# Patient Record
Sex: FEMALE | Race: WHITE | NOT HISPANIC OR LATINO
[De-identification: names, ages, dates, MRNs, and addresses within clinical notes are randomized per-mention and may not be internally consistent; named-entity substitution may affect disease eponyms.]

---

## 2018-10-01 ENCOUNTER — APPOINTMENT (OUTPATIENT)
Dept: OBGYN | Facility: CLINIC | Age: 68
End: 2018-10-01
Payer: MEDICARE

## 2018-10-01 VITALS
BODY MASS INDEX: 23.92 KG/M2 | WEIGHT: 135 LBS | HEIGHT: 63 IN | DIASTOLIC BLOOD PRESSURE: 88 MMHG | SYSTOLIC BLOOD PRESSURE: 142 MMHG

## 2018-10-01 PROCEDURE — G0101: CPT

## 2018-10-01 PROCEDURE — 82270 OCCULT BLOOD FECES: CPT

## 2020-11-11 ENCOUNTER — NON-APPOINTMENT (OUTPATIENT)
Age: 70
End: 2020-11-11

## 2020-12-02 ENCOUNTER — APPOINTMENT (OUTPATIENT)
Dept: OBGYN | Facility: CLINIC | Age: 70
End: 2020-12-02
Payer: MEDICARE

## 2020-12-02 VITALS
DIASTOLIC BLOOD PRESSURE: 74 MMHG | WEIGHT: 126 LBS | SYSTOLIC BLOOD PRESSURE: 113 MMHG | BODY MASS INDEX: 22.32 KG/M2 | HEIGHT: 63 IN

## 2020-12-02 PROCEDURE — 82270 OCCULT BLOOD FECES: CPT

## 2020-12-02 PROCEDURE — G0101: CPT

## 2020-12-02 NOTE — HISTORY OF PRESENT ILLNESS
[FreeTextEntry1] : 69yo,  with gyn history significant for:\par 1.Vulvovaginal atrophy\par 2.Hot flashes\par LMP 1999

## 2020-12-02 NOTE — DISCUSSION/SUMMARY
[FreeTextEntry1] : 69yo without gyn complaint.\par Flashes perisit despite Effexor(per IM) \par Plan:\par 1.Mammo\par 2.BMD\par 3.Colonosocpy per routine

## 2021-01-11 ENCOUNTER — NON-APPOINTMENT (OUTPATIENT)
Age: 71
End: 2021-01-11

## 2021-01-22 ENCOUNTER — NON-APPOINTMENT (OUTPATIENT)
Age: 71
End: 2021-01-22

## 2022-04-12 ENCOUNTER — APPOINTMENT (OUTPATIENT)
Dept: OBGYN | Facility: CLINIC | Age: 72
End: 2022-04-12
Payer: MEDICARE

## 2022-04-12 DIAGNOSIS — Z01.419 ENCOUNTER FOR GYNECOLOGICAL EXAMINATION (GENERAL) (ROUTINE) W/OUT ABNORMAL FINDINGS: ICD-10-CM

## 2022-04-12 DIAGNOSIS — Z12.11 ENCOUNTER FOR SCREENING FOR MALIGNANT NEOPLASM OF COLON: ICD-10-CM

## 2022-04-12 DIAGNOSIS — Z12.12 ENCOUNTER FOR SCREENING FOR MALIGNANT NEOPLASM OF COLON: ICD-10-CM

## 2022-04-12 PROCEDURE — G0101: CPT

## 2022-04-12 PROCEDURE — 82270 OCCULT BLOOD FECES: CPT

## 2022-04-12 NOTE — DISCUSSION/SUMMARY
[FreeTextEntry1] : 73yo without gyn complaint\par Plan:\par 1.Mammo\par 2.BMD 2021\par 3.Effexor per IM re flashes\par 4.Colonoscopy per routine

## 2022-04-12 NOTE — HISTORY OF PRESENT ILLNESS
[FreeTextEntry1] : 73yo,  with gyn history significant for:\par 1.Vulvovaginal atrophy\par 2.Hot flashes\par LMP 1999